# Patient Record
Sex: FEMALE | Race: WHITE | NOT HISPANIC OR LATINO | URBAN - METROPOLITAN AREA
[De-identification: names, ages, dates, MRNs, and addresses within clinical notes are randomized per-mention and may not be internally consistent; named-entity substitution may affect disease eponyms.]

---

## 2017-02-10 ENCOUNTER — HOSPITAL ENCOUNTER (OUTPATIENT)
Facility: CLINIC | Age: 47
Setting detail: OBSERVATION
Discharge: HOME | End: 2017-02-11
Attending: EMERGENCY MEDICINE | Admitting: OBSTETRICS & GYNECOLOGY
Payer: COMMERCIAL

## 2017-02-10 ENCOUNTER — APPOINTMENT (OUTPATIENT)
Dept: CT IMAGING | Facility: CLINIC | Age: 47
End: 2017-02-10
Attending: EMERGENCY MEDICINE
Payer: COMMERCIAL

## 2017-02-10 ENCOUNTER — APPOINTMENT (OUTPATIENT)
Dept: ULTRASOUND IMAGING | Facility: CLINIC | Age: 47
End: 2017-02-10
Attending: EMERGENCY MEDICINE
Payer: COMMERCIAL

## 2017-02-10 ENCOUNTER — TRANSFERRED RECORDS (OUTPATIENT)
Dept: HEALTH INFORMATION MANAGEMENT | Facility: CLINIC | Age: 47
End: 2017-02-10

## 2017-02-10 DIAGNOSIS — N83.201 CYST OF RIGHT OVARY: ICD-10-CM

## 2017-02-10 LAB
ALBUMIN UR-MCNC: 10 MG/DL
AMORPH CRY #/AREA URNS HPF: ABNORMAL /HPF
APPEARANCE UR: ABNORMAL
BACTERIA #/AREA URNS HPF: ABNORMAL /HPF
BILIRUB UR QL STRIP: NEGATIVE
COLOR UR AUTO: YELLOW
GLUCOSE UR STRIP-MCNC: NEGATIVE MG/DL
HGB UR QL STRIP: NEGATIVE
KETONES UR STRIP-MCNC: 5 MG/DL
LEUKOCYTE ESTERASE UR QL STRIP: NEGATIVE
MUCOUS THREADS #/AREA URNS LPF: PRESENT /LPF
NITRATE UR QL: NEGATIVE
PH UR STRIP: 8 PH (ref 5–7)
RBC #/AREA URNS AUTO: 2 /HPF (ref 0–2)
SP GR UR STRIP: 1.02 (ref 1–1.03)
SQUAMOUS #/AREA URNS AUTO: 2 /HPF (ref 0–1)
URN SPEC COLLECT METH UR: ABNORMAL
UROBILINOGEN UR STRIP-MCNC: NORMAL MG/DL (ref 0–2)
WBC #/AREA URNS AUTO: 2 /HPF (ref 0–2)

## 2017-02-10 PROCEDURE — 99285 EMERGENCY DEPT VISIT HI MDM: CPT | Mod: 25

## 2017-02-10 PROCEDURE — 25500064 ZZH RX 255 OP 636: Performed by: EMERGENCY MEDICINE

## 2017-02-10 PROCEDURE — 93976 VASCULAR STUDY: CPT | Mod: XS

## 2017-02-10 PROCEDURE — 74177 CT ABD & PELVIS W/CONTRAST: CPT

## 2017-02-10 PROCEDURE — 81001 URINALYSIS AUTO W/SCOPE: CPT | Performed by: EMERGENCY MEDICINE

## 2017-02-10 PROCEDURE — 96374 THER/PROPH/DIAG INJ IV PUSH: CPT

## 2017-02-10 PROCEDURE — 25000128 H RX IP 250 OP 636: Performed by: EMERGENCY MEDICINE

## 2017-02-10 RX ORDER — HYDROMORPHONE HYDROCHLORIDE 1 MG/ML
0.5 INJECTION, SOLUTION INTRAMUSCULAR; INTRAVENOUS; SUBCUTANEOUS
Status: COMPLETED | OUTPATIENT
Start: 2017-02-10 | End: 2017-02-11

## 2017-02-10 RX ORDER — BUPROPION HYDROCHLORIDE 150 MG/1
150 TABLET, EXTENDED RELEASE ORAL
COMMUNITY
Start: 2016-06-17 | End: 2017-02-11

## 2017-02-10 RX ORDER — IOPAMIDOL 755 MG/ML
500 INJECTION, SOLUTION INTRAVASCULAR ONCE
Status: COMPLETED | OUTPATIENT
Start: 2017-02-10 | End: 2017-02-10

## 2017-02-10 RX ADMIN — IOPAMIDOL 95 ML: 755 INJECTION, SOLUTION INTRAVENOUS at 23:13

## 2017-02-10 RX ADMIN — HYDROMORPHONE HYDROCHLORIDE 0.5 MG: 1 INJECTION, SOLUTION INTRAMUSCULAR; INTRAVENOUS; SUBCUTANEOUS at 22:38

## 2017-02-10 RX ADMIN — SODIUM CHLORIDE 65 ML: 9 INJECTION, SOLUTION INTRAVENOUS at 23:13

## 2017-02-10 ASSESSMENT — ENCOUNTER SYMPTOMS
COUGH: 0
FEVER: 0
ABDOMINAL PAIN: 1
NAUSEA: 1
VOMITING: 1
DIARRHEA: 0
RHINORRHEA: 0
CONSTIPATION: 0
SORE THROAT: 0

## 2017-02-10 NOTE — ED AVS SNAPSHOT
MRN:[id number]                      After Visit Summary   2/10/2017    Philomena Elizalde    MRN: [id number]           Thank you!     Thank you for choosing [facility] for your care. Our goal is always to provide you with excellent care. Hearing back from our patients is one way we can continue to improve our services. Please take a few minutes to complete the written survey that you may receive in the mail after you visit. If you would like to speak to someone directly about your visit please contact Patient Relations at [phone]. Thank you!          Patient Information     Date Of Birth          1970        About your hospital stay     You were admitted on:  February 11, 2017 You last received care in the:  [facility] Observation Department    You were discharged on:  February 11, 2017       Who to Call     For medical emergencies, please call 911.  For non-urgent questions about your medical care, please call your primary care provider or clinic, [phone]  For questions related to your surgery, please call your surgery clinic        Attending Provider     Provider    Oanh Carl MD Shibley, Kirk Anthony, MD       Primary Care Provider Office Phone # Fax #    Teodoro Mishra [phone]       [address] [address]        After Care Instructions     Discharge Instructions       Patient to arrange follow up appointment in 2  weeks                             Pending Results     Date and Time Order Name Status Description    2/11/2017 1053 Surgical pathology exam In process             Statement of Approval     Ordered          02/11/17 1108  I have reviewed and agree with all the recommendations and orders detailed in this document.   EFFECTIVE NOW     Approved and electronically signed by:  Yuko Mason MD             Admission Information        Provider Department Dept  "Phone    2/10/2017 Jhoan Rivero MD  Observation Dept [phone]      Your Vitals Were     Blood Pressure Pulse Temperature    121/65 mmHg 74 98.1  F (36.7  C) (Oral)    Respirations Height Weight    16 1.651 m (5' 5\") 87.816 kg (193 lb 9.6 oz)    BMI (Body Mass Index) Pulse Oximetry       32.22 kg/m2 95%       MyChart Information     [employer] lets you send messages to your doctor, view your test results, renew your prescriptions, schedule appointments and more. To sign up, go to [email or web address] . Click on \"Log in\" on the left side of the screen, which will take you to the Welcome page. Then click on \"Sign up Now\" on the right side of the page.     You will be asked to enter the access code listed below, as well as some personal information. Please follow the directions to create your username and password.     Your access code is: [email or web address]  Expires: 2017  1:15 AM     Your access code will  in 90 days. If you need help or a new code, please call your [place] clinic or [phone].        Care EveryWhere ID     This is your Care EveryWhere ID. This could be used by other organizations to access your [place] medical records  [id number]           Review of your medicines      START taking        Dose / Directions    oxyCODONE 5 MG IR tablet   Commonly known as:  ROXICODONE   Used for:  Cyst of right ovary        Dose:  5-10 mg   Take 1-2 tablets (5-10 mg) by mouth every 3 hours as needed for pain or other (Moderate to Severe)   Quantity:  10 tablet   Refills:  0         CONTINUE these medicines which have NOT CHANGED        Dose / Directions    Multi Vitamin Tabs        Dose:  1 tablet   Take 1 tablet by mouth daily   Refills:  0       priLOSEC 20 MG CR capsule   Generic drug:  omeprazole        Dose:  20 mg   Take 20 mg by mouth   Refills:  0            Where to get your medicines      Some of these will need a paper prescription and others can be bought over the counter. Ask " your nurse if you have questions.     Bring a paper prescription for each of these medications    - oxyCODONE 5 MG IR tablet             Protect others around you: Learn how to safely use, store and throw away your medicines at www.disposemymeds.org.             Medication List: This is a list of all your medications and when to take them. Check marks below indicate your daily home schedule. Keep this list as a reference.      Medications           Morning Afternoon Evening Bedtime As Needed    Multi Vitamin Tabs   Take 1 tablet by mouth daily                                oxyCODONE 5 MG IR tablet   Commonly known as:  ROXICODONE   Take 1-2 tablets (5-10 mg) by mouth every 3 hours as needed for pain or other (Moderate to Severe)                                priLOSEC 20 MG CR capsule   Take 20 mg by mouth   Generic drug:  omeprazole

## 2017-02-11 ENCOUNTER — ANESTHESIA (OUTPATIENT)
Dept: SURGERY | Facility: CLINIC | Age: 47
End: 2017-02-11
Payer: COMMERCIAL

## 2017-02-11 ENCOUNTER — ANESTHESIA EVENT (OUTPATIENT)
Dept: SURGERY | Facility: CLINIC | Age: 47
End: 2017-02-11
Payer: COMMERCIAL

## 2017-02-11 VITALS
HEIGHT: 65 IN | BODY MASS INDEX: 32.26 KG/M2 | TEMPERATURE: 98.1 F | SYSTOLIC BLOOD PRESSURE: 121 MMHG | WEIGHT: 193.6 LBS | RESPIRATION RATE: 16 BRPM | OXYGEN SATURATION: 95 % | HEART RATE: 74 BPM | DIASTOLIC BLOOD PRESSURE: 65 MMHG

## 2017-02-11 PROBLEM — R10.9 ABDOMINAL PAIN: Status: ACTIVE | Noted: 2017-02-11

## 2017-02-11 LAB
ABO + RH BLD: NORMAL
ABO + RH BLD: NORMAL
BASOPHILS # BLD AUTO: 0 10E9/L (ref 0–0.2)
BASOPHILS NFR BLD AUTO: 0.2 %
BLD GP AB SCN SERPL QL: NORMAL
BLOOD BANK CMNT PATIENT-IMP: NORMAL
DIFFERENTIAL METHOD BLD: ABNORMAL
EOSINOPHIL # BLD AUTO: 0.1 10E9/L (ref 0–0.7)
EOSINOPHIL NFR BLD AUTO: 0.5 %
ERYTHROCYTE [DISTWIDTH] IN BLOOD BY AUTOMATED COUNT: 14.4 % (ref 10–15)
HCG SERPL QL: NEGATIVE
HCT VFR BLD AUTO: 36.3 % (ref 35–47)
HGB BLD-MCNC: 11.7 G/DL (ref 11.7–15.7)
IMM GRANULOCYTES # BLD: 0.1 10E9/L (ref 0–0.4)
IMM GRANULOCYTES NFR BLD: 0.5 %
LYMPHOCYTES # BLD AUTO: 3.2 10E9/L (ref 0.8–5.3)
LYMPHOCYTES NFR BLD AUTO: 25.7 %
MCH RBC QN AUTO: 23.5 PG (ref 26.5–33)
MCHC RBC AUTO-ENTMCNC: 32.2 G/DL (ref 31.5–36.5)
MCV RBC AUTO: 73 FL (ref 78–100)
MONOCYTES # BLD AUTO: 0.7 10E9/L (ref 0–1.3)
MONOCYTES NFR BLD AUTO: 5.6 %
NEUTROPHILS # BLD AUTO: 8.4 10E9/L (ref 1.6–8.3)
NEUTROPHILS NFR BLD AUTO: 67.5 %
NRBC # BLD AUTO: 0 10*3/UL
NRBC BLD AUTO-RTO: 0 /100
PLATELET # BLD AUTO: 221 10E9/L (ref 150–450)
RBC # BLD AUTO: 4.97 10E12/L (ref 3.8–5.2)
SPECIMEN EXP DATE BLD: NORMAL
WBC # BLD AUTO: 12.4 10E9/L (ref 4–11)

## 2017-02-11 PROCEDURE — 25000125 ZZHC RX 250: Performed by: NURSE ANESTHETIST, CERTIFIED REGISTERED

## 2017-02-11 PROCEDURE — 25000128 H RX IP 250 OP 636: Performed by: OBSTETRICS & GYNECOLOGY

## 2017-02-11 PROCEDURE — 25000566 ZZH SEVOFLURANE, EA 15 MIN: Performed by: OBSTETRICS & GYNECOLOGY

## 2017-02-11 PROCEDURE — 84703 CHORIONIC GONADOTROPIN ASSAY: CPT | Performed by: OBSTETRICS & GYNECOLOGY

## 2017-02-11 PROCEDURE — 40000934 ZZH STATISTIC OUTPATIENT (NON-OBS) DAY

## 2017-02-11 PROCEDURE — 88305 TISSUE EXAM BY PATHOLOGIST: CPT | Mod: 26 | Performed by: OBSTETRICS & GYNECOLOGY

## 2017-02-11 PROCEDURE — 96361 HYDRATE IV INFUSION ADD-ON: CPT

## 2017-02-11 PROCEDURE — 25000132 ZZH RX MED GY IP 250 OP 250 PS 637: Performed by: OBSTETRICS & GYNECOLOGY

## 2017-02-11 PROCEDURE — 85025 COMPLETE CBC W/AUTO DIFF WBC: CPT | Performed by: OBSTETRICS & GYNECOLOGY

## 2017-02-11 PROCEDURE — 25000125 ZZHC RX 250: Performed by: ANESTHESIOLOGY

## 2017-02-11 PROCEDURE — 25000125 ZZHC RX 250: Performed by: OBSTETRICS & GYNECOLOGY

## 2017-02-11 PROCEDURE — 25000128 H RX IP 250 OP 636: Performed by: NURSE ANESTHETIST, CERTIFIED REGISTERED

## 2017-02-11 PROCEDURE — 37000009 ZZH ANESTHESIA TECHNICAL FEE, EACH ADDTL 15 MIN: Performed by: OBSTETRICS & GYNECOLOGY

## 2017-02-11 PROCEDURE — 40000935 ZZH STATISTIC OUTPATIENT (NON-OBS) EVE

## 2017-02-11 PROCEDURE — 86901 BLOOD TYPING SEROLOGIC RH(D): CPT | Performed by: OBSTETRICS & GYNECOLOGY

## 2017-02-11 PROCEDURE — 25800025 ZZH RX 258: Performed by: OBSTETRICS & GYNECOLOGY

## 2017-02-11 PROCEDURE — 25800025 ZZH RX 258: Performed by: ANESTHESIOLOGY

## 2017-02-11 PROCEDURE — 25000128 H RX IP 250 OP 636: Performed by: EMERGENCY MEDICINE

## 2017-02-11 PROCEDURE — 86850 RBC ANTIBODY SCREEN: CPT | Performed by: OBSTETRICS & GYNECOLOGY

## 2017-02-11 PROCEDURE — 27210794 ZZH OR GENERAL SUPPLY STERILE: Performed by: OBSTETRICS & GYNECOLOGY

## 2017-02-11 PROCEDURE — 40000306 ZZH STATISTIC PRE PROC ASSESS II: Performed by: OBSTETRICS & GYNECOLOGY

## 2017-02-11 PROCEDURE — 71000012 ZZH RECOVERY PHASE 1 LEVEL 1 FIRST HR: Performed by: OBSTETRICS & GYNECOLOGY

## 2017-02-11 PROCEDURE — 25800025 ZZH RX 258: Performed by: EMERGENCY MEDICINE

## 2017-02-11 PROCEDURE — 37000008 ZZH ANESTHESIA TECHNICAL FEE, 1ST 30 MIN: Performed by: OBSTETRICS & GYNECOLOGY

## 2017-02-11 PROCEDURE — 86900 BLOOD TYPING SEROLOGIC ABO: CPT | Performed by: OBSTETRICS & GYNECOLOGY

## 2017-02-11 PROCEDURE — 88305 TISSUE EXAM BY PATHOLOGIST: CPT | Performed by: OBSTETRICS & GYNECOLOGY

## 2017-02-11 PROCEDURE — 96376 TX/PRO/DX INJ SAME DRUG ADON: CPT

## 2017-02-11 PROCEDURE — G0378 HOSPITAL OBSERVATION PER HR: HCPCS

## 2017-02-11 PROCEDURE — 36000058 ZZH SURGERY LEVEL 3 EA 15 ADDTL MIN: Performed by: OBSTETRICS & GYNECOLOGY

## 2017-02-11 PROCEDURE — 36415 COLL VENOUS BLD VENIPUNCTURE: CPT | Performed by: OBSTETRICS & GYNECOLOGY

## 2017-02-11 PROCEDURE — 36000056 ZZH SURGERY LEVEL 3 1ST 30 MIN: Performed by: OBSTETRICS & GYNECOLOGY

## 2017-02-11 RX ORDER — LIDOCAINE 40 MG/G
CREAM TOPICAL
Status: DISCONTINUED | OUTPATIENT
Start: 2017-02-11 | End: 2017-02-11 | Stop reason: HOSPADM

## 2017-02-11 RX ORDER — CEFAZOLIN SODIUM 1 G/3ML
1 INJECTION, POWDER, FOR SOLUTION INTRAMUSCULAR; INTRAVENOUS SEE ADMIN INSTRUCTIONS
Status: DISCONTINUED | OUTPATIENT
Start: 2017-02-11 | End: 2017-02-11 | Stop reason: HOSPADM

## 2017-02-11 RX ORDER — GLYCOPYRROLATE 0.2 MG/ML
INJECTION, SOLUTION INTRAMUSCULAR; INTRAVENOUS PRN
Status: DISCONTINUED | OUTPATIENT
Start: 2017-02-11 | End: 2017-02-11

## 2017-02-11 RX ORDER — ONDANSETRON 2 MG/ML
4 INJECTION INTRAMUSCULAR; INTRAVENOUS EVERY 30 MIN PRN
Status: DISCONTINUED | OUTPATIENT
Start: 2017-02-11 | End: 2017-02-11 | Stop reason: HOSPADM

## 2017-02-11 RX ORDER — MEPERIDINE HYDROCHLORIDE 25 MG/ML
12.5 INJECTION INTRAMUSCULAR; INTRAVENOUS; SUBCUTANEOUS
Status: DISCONTINUED | OUTPATIENT
Start: 2017-02-11 | End: 2017-02-11 | Stop reason: HOSPADM

## 2017-02-11 RX ORDER — HYDROCODONE BITARTRATE AND ACETAMINOPHEN 5; 325 MG/1; MG/1
1-2 TABLET ORAL
Status: COMPLETED | OUTPATIENT
Start: 2017-02-11 | End: 2017-02-11

## 2017-02-11 RX ORDER — LABETALOL HYDROCHLORIDE 5 MG/ML
10 INJECTION, SOLUTION INTRAVENOUS
Status: DISCONTINUED | OUTPATIENT
Start: 2017-02-11 | End: 2017-02-11 | Stop reason: HOSPADM

## 2017-02-11 RX ORDER — ONDANSETRON 4 MG/1
4 TABLET, ORALLY DISINTEGRATING ORAL EVERY 30 MIN PRN
Status: DISCONTINUED | OUTPATIENT
Start: 2017-02-11 | End: 2017-02-11 | Stop reason: HOSPADM

## 2017-02-11 RX ORDER — IBUPROFEN 600 MG/1
600 TABLET, FILM COATED ORAL
Status: DISCONTINUED | OUTPATIENT
Start: 2017-02-11 | End: 2017-02-11 | Stop reason: HOSPADM

## 2017-02-11 RX ORDER — ONDANSETRON 2 MG/ML
4 INJECTION INTRAMUSCULAR; INTRAVENOUS EVERY 8 HOURS PRN
Status: DISCONTINUED | OUTPATIENT
Start: 2017-02-11 | End: 2017-02-11 | Stop reason: HOSPADM

## 2017-02-11 RX ORDER — DEXAMETHASONE SODIUM PHOSPHATE 4 MG/ML
INJECTION, SOLUTION INTRA-ARTICULAR; INTRALESIONAL; INTRAMUSCULAR; INTRAVENOUS; SOFT TISSUE PRN
Status: DISCONTINUED | OUTPATIENT
Start: 2017-02-11 | End: 2017-02-11

## 2017-02-11 RX ORDER — NALOXONE HYDROCHLORIDE 0.4 MG/ML
.1-.4 INJECTION, SOLUTION INTRAMUSCULAR; INTRAVENOUS; SUBCUTANEOUS
Status: DISCONTINUED | OUTPATIENT
Start: 2017-02-11 | End: 2017-02-11 | Stop reason: HOSPADM

## 2017-02-11 RX ORDER — SODIUM CHLORIDE, SODIUM LACTATE, POTASSIUM CHLORIDE, CALCIUM CHLORIDE 600; 310; 30; 20 MG/100ML; MG/100ML; MG/100ML; MG/100ML
INJECTION, SOLUTION INTRAVENOUS CONTINUOUS
Status: DISCONTINUED | OUTPATIENT
Start: 2017-02-11 | End: 2017-02-11 | Stop reason: HOSPADM

## 2017-02-11 RX ORDER — FENTANYL CITRATE 50 UG/ML
INJECTION, SOLUTION INTRAMUSCULAR; INTRAVENOUS PRN
Status: DISCONTINUED | OUTPATIENT
Start: 2017-02-11 | End: 2017-02-11

## 2017-02-11 RX ORDER — HYDROMORPHONE HYDROCHLORIDE 1 MG/ML
.3-.5 INJECTION, SOLUTION INTRAMUSCULAR; INTRAVENOUS; SUBCUTANEOUS EVERY 10 MIN PRN
Status: DISCONTINUED | OUTPATIENT
Start: 2017-02-11 | End: 2017-02-11 | Stop reason: HOSPADM

## 2017-02-11 RX ORDER — NEOSTIGMINE METHYLSULFATE 1 MG/ML
VIAL (ML) INJECTION PRN
Status: DISCONTINUED | OUTPATIENT
Start: 2017-02-11 | End: 2017-02-11

## 2017-02-11 RX ORDER — PROPOFOL 10 MG/ML
INJECTION, EMULSION INTRAVENOUS PRN
Status: DISCONTINUED | OUTPATIENT
Start: 2017-02-11 | End: 2017-02-11

## 2017-02-11 RX ORDER — HYDROMORPHONE HYDROCHLORIDE 1 MG/ML
.3-.5 INJECTION, SOLUTION INTRAMUSCULAR; INTRAVENOUS; SUBCUTANEOUS
Status: DISCONTINUED | OUTPATIENT
Start: 2017-02-11 | End: 2017-02-11 | Stop reason: HOSPADM

## 2017-02-11 RX ORDER — OXYCODONE HYDROCHLORIDE 5 MG/1
5-10 TABLET ORAL
Qty: 10 TABLET | Refills: 0 | Status: SHIPPED | OUTPATIENT
Start: 2017-02-11

## 2017-02-11 RX ORDER — KETOROLAC TROMETHAMINE 30 MG/ML
30 INJECTION, SOLUTION INTRAMUSCULAR; INTRAVENOUS ONCE
Status: COMPLETED | OUTPATIENT
Start: 2017-02-11 | End: 2017-02-11

## 2017-02-11 RX ORDER — FENTANYL CITRATE 50 UG/ML
25-50 INJECTION, SOLUTION INTRAMUSCULAR; INTRAVENOUS
Status: DISCONTINUED | OUTPATIENT
Start: 2017-02-11 | End: 2017-02-11 | Stop reason: HOSPADM

## 2017-02-11 RX ORDER — CEFAZOLIN SODIUM 2 G/100ML
2 INJECTION, SOLUTION INTRAVENOUS
Status: COMPLETED | OUTPATIENT
Start: 2017-02-11 | End: 2017-02-11

## 2017-02-11 RX ORDER — PHENAZOPYRIDINE HYDROCHLORIDE 200 MG/1
200 TABLET, FILM COATED ORAL ONCE
Status: DISCONTINUED | OUTPATIENT
Start: 2017-02-11 | End: 2017-02-11 | Stop reason: HOSPADM

## 2017-02-11 RX ORDER — ACETAMINOPHEN 325 MG/1
975 TABLET ORAL ONCE
Status: COMPLETED | OUTPATIENT
Start: 2017-02-11 | End: 2017-02-11

## 2017-02-11 RX ORDER — BUPIVACAINE HYDROCHLORIDE AND EPINEPHRINE 2.5; 5 MG/ML; UG/ML
INJECTION, SOLUTION INFILTRATION; PERINEURAL PRN
Status: DISCONTINUED | OUTPATIENT
Start: 2017-02-11 | End: 2017-02-11 | Stop reason: HOSPADM

## 2017-02-11 RX ADMIN — HYDROCODONE BITARTRATE AND ACETAMINOPHEN 2 TABLET: 5; 325 TABLET ORAL at 15:46

## 2017-02-11 RX ADMIN — FENTANYL CITRATE 100 MCG: 50 INJECTION, SOLUTION INTRAMUSCULAR; INTRAVENOUS at 10:13

## 2017-02-11 RX ADMIN — HYDROMORPHONE HYDROCHLORIDE 0.5 MG: 1 INJECTION, SOLUTION INTRAMUSCULAR; INTRAVENOUS; SUBCUTANEOUS at 00:48

## 2017-02-11 RX ADMIN — Medication 3 MG: at 11:00

## 2017-02-11 RX ADMIN — SODIUM CHLORIDE, POTASSIUM CHLORIDE, SODIUM LACTATE AND CALCIUM CHLORIDE: 600; 310; 30; 20 INJECTION, SOLUTION INTRAVENOUS at 05:36

## 2017-02-11 RX ADMIN — HYDROMORPHONE HYDROCHLORIDE 0.5 MG: 1 INJECTION, SOLUTION INTRAMUSCULAR; INTRAVENOUS; SUBCUTANEOUS at 02:59

## 2017-02-11 RX ADMIN — GLYCOPYRROLATE 0.4 MG: 0.2 INJECTION, SOLUTION INTRAMUSCULAR; INTRAVENOUS at 11:00

## 2017-02-11 RX ADMIN — SODIUM CHLORIDE, POTASSIUM CHLORIDE, SODIUM LACTATE AND CALCIUM CHLORIDE: 600; 310; 30; 20 INJECTION, SOLUTION INTRAVENOUS at 10:05

## 2017-02-11 RX ADMIN — PROPOFOL 200 MG: 10 INJECTION, EMULSION INTRAVENOUS at 10:13

## 2017-02-11 RX ADMIN — KETOROLAC TROMETHAMINE 30 MG: 30 INJECTION, SOLUTION INTRAMUSCULAR at 10:05

## 2017-02-11 RX ADMIN — FENTANYL CITRATE 50 MCG: 50 INJECTION, SOLUTION INTRAMUSCULAR; INTRAVENOUS at 10:42

## 2017-02-11 RX ADMIN — FENTANYL CITRATE 100 MCG: 50 INJECTION, SOLUTION INTRAMUSCULAR; INTRAVENOUS at 10:25

## 2017-02-11 RX ADMIN — DEXAMETHASONE SODIUM PHOSPHATE 4 MG: 4 INJECTION, SOLUTION INTRAMUSCULAR; INTRAVENOUS at 10:13

## 2017-02-11 RX ADMIN — ACETAMINOPHEN 975 MG: 325 TABLET, FILM COATED ORAL at 10:03

## 2017-02-11 RX ADMIN — ROCURONIUM BROMIDE 40 MG: 10 INJECTION INTRAVENOUS at 10:13

## 2017-02-11 RX ADMIN — CEFAZOLIN SODIUM 2 G: 2 INJECTION, SOLUTION INTRAVENOUS at 10:05

## 2017-02-11 RX ADMIN — HYDROMORPHONE HYDROCHLORIDE 0.5 MG: 1 INJECTION, SOLUTION INTRAMUSCULAR; INTRAVENOUS; SUBCUTANEOUS at 05:38

## 2017-02-11 RX ADMIN — ONDANSETRON 4 MG: 2 INJECTION INTRAMUSCULAR; INTRAVENOUS at 10:13

## 2017-02-11 RX ADMIN — Medication 30 MG: at 10:13

## 2017-02-11 RX ADMIN — MIDAZOLAM HYDROCHLORIDE 2 MG: 1 INJECTION, SOLUTION INTRAMUSCULAR; INTRAVENOUS at 10:05

## 2017-02-11 RX ADMIN — DEXTROSE AND SODIUM CHLORIDE: 5; 450 INJECTION, SOLUTION INTRAVENOUS at 01:36

## 2017-02-11 RX ADMIN — HYDROMORPHONE HYDROCHLORIDE 0.5 MG: 1 INJECTION, SOLUTION INTRAMUSCULAR; INTRAVENOUS; SUBCUTANEOUS at 08:40

## 2017-02-11 RX ADMIN — SODIUM CHLORIDE, POTASSIUM CHLORIDE, SODIUM LACTATE AND CALCIUM CHLORIDE: 600; 310; 30; 20 INJECTION, SOLUTION INTRAVENOUS at 11:44

## 2017-02-11 ASSESSMENT — PAIN DESCRIPTION - DESCRIPTORS: DESCRIPTORS: HEADACHE;THROBBING;SHARP

## 2017-02-11 ASSESSMENT — LIFESTYLE VARIABLES: TOBACCO_USE: 1

## 2017-02-11 NOTE — ANESTHESIA PREPROCEDURE EVALUATION
Anesthesia Evaluation     . Pt has had prior anesthetic. Type: General    No history of anesthetic complications     ROS/MED HX    ENT/Pulmonary:     (+)tobacco use, Current use , . .    Neurologic:  - neg neurologic ROS     Cardiovascular:         METS/Exercise Tolerance:     Hematologic: Comments: Lab Test        02/11/17                       0505          WBC          12.4*         HGB          11.7          MCV          73*           PLT          221            No lab results found.            Musculoskeletal:  - neg musculoskeletal ROS       GI/Hepatic:  - neg GI/hepatic ROS       Renal/Genitourinary:  - ROS Renal section negative       Endo:  - neg endo ROS       Psychiatric:  - neg psychiatric ROS       Infectious Disease:  - neg infectious disease ROS       Malignancy:         Other:    - neg other ROS           Physical Exam  Normal systems: cardiovascular, pulmonary and dental    Airway   Mallampati: II  TM distance: >3 FB  Neck ROM: full    Dental     Cardiovascular       Pulmonary                     Anesthesia Plan      History & Physical Review  History and physical reviewed and following examination; no interval change.    ASA Status:  2 emergent.    NPO Status:  > 8 hours    Plan for General with Intravenous induction. Maintenance will be Balanced.    PONV prophylaxis:  Ondansetron (or other 5HT-3) and Dexamethasone or Solumedrol       Postoperative Care  Postoperative pain management:  IV analgesics and Oral pain medications.      Consents  Anesthetic plan, risks, benefits and alternatives discussed with:  Patient..                          .

## 2017-02-11 NOTE — ED NOTES
Bed: ED28  Expected date: 2/10/17  Expected time: 9:02 PM  Means of arrival: Ambulance  Comments:  [facility] [place]

## 2017-02-11 NOTE — ED NOTES
PRIMARY DIAGNOSIS: Ovarian cyst, abd pain.   Primary Symptoms: ABD pain, nausea.   OUTPATIENT/OBSERVATION GOALS TO BE MET BEFORE DISCHARGE:   1. Orthostatic symptoms (BP decrease or HR increase with patient upright)? N/A   2. Vital Signs stable? Yes   3. Documented urine output: Yes   4. Tolerating PO fluid: No - NPO for OB   5. Symptoms improved: No - RLQ  6. Labs WNL? Yes  7. ADLs back to baseline? Yes   8. Activity and level of assistance: Ambulating independently.   9. Pain status: Improved but still requiring IV narcotics. Improved after IV dilaudid  10. Barriers to discharge noted Yes, Pain control, GYN consult.   Is patient a falls risk? No   Falls armband on? Not applicable   Within Arm's Reach? No.Reason not within arm's reach is: A&O x 4, steady gait.   Bed alarm turned on? Not applicable   Personal alarm in place and turned on? Not applicable   CT scan shows ovarian cyst, possible ovarian torsion. GYN admitting and has yet to see pt. This AM. Pt. Alert, oriented, reporting 5/10 pain in RLQ with pain goal of 2-3/10, 0.5 dilaudid. Pt states medication reduces pain but doesn't completely relieve. Heating pad offered.

## 2017-02-11 NOTE — ED NOTES
OBSERVATION patient IN TIME: 0203  ROOM # 203    Living Situation (if not independent, order SW consult): Independent  Facility name: N/A    Activity level at baseline: Independent  Activity level on admit: Independent.     Is patient a falls risk? No  Falls armband on? Not applicable  Within Arm's Reach? No.Reason not within arm's reach is: Pt A & O, steady gait, Calls appropriately.   Bed alarm turned on?   Not applicable  Personal alarm in place and turned on?   Not applicable    Patient registered to observation; given Patient Bill of Rights; given the opportunity to ask questions about observation status and their plan of care.  Patient has been oriented to the observation room, bathroom, and call light is in place.

## 2017-02-11 NOTE — ED NOTES
PRIMARY DIAGNOSIS: Ovarian cyst, abd pain.   Primary Symptoms: ABD pain, nausea.     OUTPATIENT/OBSERVATION GOALS TO BE MET BEFORE DISCHARGE:    1. Orthostatic symptoms (BP decrease or HR increase with patient upright)?  N/A  2. Vital Signs stable? Yes  3. Documented urine output: Yes  4. Tolerating PO fluid: No  5. Symptoms improved: No  6. Labs WNL? No  7. ADLs back to baseline?  Yes  8. Activity and level of assistance: Ambulating independently.  9. Pain status: Improved but still requiring IV narcotics.  10. Barriers to discharge noted Yes, Pain control, GYN consult.     Is patient a falls risk? No  Falls armband on?  Not applicable  Within Arm's Reach? No.Reason not within arm's reach is: A&O x 4, steady gait.   Bed alarm turned on?   Not applicable  Personal alarm in place and turned on?   Not applicable    Pt admitted to observation unit following sudden onset abd pain to right lower quadrant.  CT scan shows ovarian cyst, possible ovarian torsion. GYN consulted in ED.  No surgical intervention at this time.  GYN to follow pt.  VSS, pt A&O x 4.  IV dilaudid given x 1 for pain control.  Pt states medication reduces pain but doesn't completely relieve. Denies need for further pain interventions at this time.

## 2017-02-11 NOTE — ANESTHESIA CARE TRANSFER NOTE
Patient: Philomena Elizalde    Procedure(s):  LAPAROSCOPIC BILATERAL SALPINGO-OOPHORECTOMY TORSION RIGHT OVARY - Wound Class: II-Clean Contaminated    Diagnosis: ovarian torsion  Diagnosis Additional Information: No value filed.    Anesthesia Type:   General     Note:  Airway :Face Mask  Patient transferred to:PACU        Vitals: (Last set prior to Anesthesia Care Transfer)    CRNA VITALS  2/11/2017 1042 - 2/11/2017 1117      2/11/2017             Pulse: 99    SpO2: 96 %    Resp Rate (observed): 13                Electronically Signed By: JUWAN Duncan CRNA  February 11, 2017  11:17 AM

## 2017-02-11 NOTE — OR NURSING
Dr. Yuko Mason unable to reach  by phone as he is traveling.  Requested floor RN to call her when  arrives on unit.  Report given to floor RN Felipe.

## 2017-02-11 NOTE — ED NOTES
Pt presents via EMS for evaluation of sudden onset RLQ abdominal pain, that was 10/10 and started around 1530. Pt had one bout of emesis. Pt' symptoms were constant, so she went to [facility]. Pt had blood work, which showed slightly elevated WBC. Pt was given 1L of IV fluids and Toradol. Pt was sent to ED to r/o appendicitis.

## 2017-02-11 NOTE — PHARMACY-ADMISSION MEDICATION HISTORY
Admission medication history interview status for this patient is complete. See [facility] admission navigator for allergy information, prior to admission medications and immunization status.     Medication history interview source(s):Patient  Medication history resources (including written lists, pill bottles, clinic record):[facility]  Primary pharmacy:[facility]    Changes made to [facility] medication list:  Added: multivitamin  Deleted: wellbutrin  Changed: none    Actions taken by pharmacist (provider contacted, etc):None     Additional medication history information:None    Medication reconciliation/reorder completed by provider prior to medication history? No    For patients on insulin therapy: no (Yes/No)  Lantus/levemir/NPH/Mix 70/30 dose:  _____   in AM/PM  or twice daily   Sliding scale Novolog Y/N  If Yes, do you have a baseline novolog pre-meal dose:  ______units with meals   Patients eat three meals a day:   Y/N     Any Barriers to therapy:  cost of medications/comfortable with giving injections (if applicable)/ comfortable and confident with current diabetes regimen       Prior to Admission medications    Medication Sig Last Dose Taking? Auth Provider   Multiple Vitamin (MULTI VITAMIN) TABS Take 1 tablet by mouth daily 2/10/2017 at Unknown time Yes Unknown, Entered By History   omeprazole (PRILOSEC) 20 MG CR capsule Take 20 mg by mouth 2/10/2017 at AM Yes Reported, Patient

## 2017-02-11 NOTE — ED NOTES
Assisted with patient triage (ambulance). Applied monitoring equipment onto Patient (Pulse ox and BP).

## 2017-02-11 NOTE — H&P
HISTORY OF PRESENT ILLNESS:  On 02/10/2017 Philomena Elizalde was in counseling session and had to stand up to urinate.  She had sudden onset of right lower quadrant pain at that point, which was approximately 3:30 in the afternoon.  She notes she then went out to her car and became nauseated and had emesis x1.  She was at home and had another rapid onset of pain and nausea and reported to the [facility].  When she was at the urgent care clinic, there was a slight concern for an appendicitis.  The patient does have a history of bilateral inguinal hernias, which she has reduced in the past and she notes this feels different than no symptoms.  When she reported she notes that the pain was 8-9/10 and she has had minimal resolution of this unless she uses IV pain medication.  She does note that the nausea has resolved.      She denies any fevers, chills, persistent nausea, pain or changes in her bowel movements.  She has no new sexual partners.  She is currently in recovery of 1 year for alcoholism.      ALLERGIES:  The patient has no known drug allergies.      CURRENT MEDICATIONS:  Include Zyban, omeprazole, Wellbutrin.      PAST MEDICAL HISTORY:   1.  Prior recovering alcoholic.   2.  Bilateral inguinal hernias.   3.  Depression.   4.  Prior history of menorrhagia.      PAST SURGICAL HISTORY:    1.   section x2.   2.  Supracervical hysterectomy.      FAMILY HISTORY:  Noncontributory for any blood dyscrasias or complications with anesthesia.      SOCIAL HISTORY:  She is .  She is currently an LPN student.  She is positive for tobacco use currently.  She is a recovering alcoholic and no history of drug use.      REVIEW OF SYSTEMS:  Notable for persistent right lower quadrant pain with resolution of her nausea and vomiting.  They are otherwise negative.      PHYSICAL EXAMINATION:   VITAL SIGNS:  Blood pressure of 132/70, temperature of 98.6, heart rate of 89, pain of 5/10 with  Dilaudid and 8/10 with no Dilaudid.   CARDIOVASCULAR:  Regular rate and rhythm.   CHEST:  Clear to auscultation bilaterally.   ABDOMEN:  Soft with mild tenderness in the upper quadrant.  She has significant tenderness in the right lower quadrant that radiates to the left lower quadrant.  When we press on the left side, she has mild referred pain to the right.   EXTREMITIES:  Soft, nontender, no cords, erythema or edema.      LABORATORY:  Labs last night showed slightly elevated white count of 12.4.  Urinalysis was essentially negative for any infection.  CT showed a possible complex cyst in the right adnexa and ultrasound confirmed a right ovary of 5.4 in largest diameter that was enlarged with no blood flow, and it also had a serpiginous-appearing structure next to it that was very cystic; this is unknown if this is the tube, it may be a torsed tube or part of a torsed ovary or complex cyst.  With the lack of blood flow, there is suspicion for torsion.      ASSESSMENT AND PLAN:  A 46-year-old post-hysterectomy female with sudden onset of right lower quadrant pain with imaging that is highly suspicious for right ovarian torsion.  We discussed with her that this may be the cause of her pain.  There is no current findings for appendicitis.  White count is mildly elevated; however nausea has resolved and McBurney's point is negative on exam.  We did discuss with her the risks, benefits and alternatives to laparoscopy.  We also discussed with her the risks of removal of the right tube and ovary.  She did ask several questions and we will go ahead and proceed to the operating room.         JOSE J WING MD             D: 2017 09:15   T: 2017 11:31   MT: [id number]      Name:     KANE YATES   MRN:      [id number]        Account:      [id number]   :      1970           Admitted:     [id number]      Document: [id number]

## 2017-02-11 NOTE — ED NOTES
PRIMARY DIAGNOSIS/PROCEDURE: Single site bilateral salpingooophorectomy and ADRIANNA  OUTPATIENT/OBSERVATION GOALS TO BE MET BEFORE DISCHARGE:    1. Stable vital signs Yes  2. Tolerating diet:No - will try clears  3. Pain controlled with oral pain medications:  Yes - denies pain at this time  4. Positive bowel sounds:  Yes   5. Voiding without difficulty:  No - pt.'s baugh catheter removed in PACU  6. Able to ambulate:  No - pt. Stood up and moved from bed to bed  7. Provider specific discharge goals met:  No    Is patient a falls risk? Yes  Reason for falls risk: High Risk Medications  Falls armband on?  YES  Within Arm's Reach? Yes   Bed alarm turned on?   Not applicable - pt. Alert, oriented and willing to call  Personal alarm in place and turned on?   Not applicable

## 2017-02-11 NOTE — ED PROVIDER NOTES
History     Chief Complaint:  Abdominal Pain     HPI   Philomena Elizalde is a 46 year old female who presents via EMS with constant, non-radiating RLQ abdominal pain since  today. The patient reports that she was feeling really well this morning and ate breakfast and went to work like normal. At 1530 today she had to urinate so she went to the bathroom, and as soon as she started to urinate she had abrupt onset of severe, sharp RLQ abdominal pain. She initially had nausea with vomiting but that has since resolved. The patient first went to [facility] urgent care where she underwent labs, results below, and was sent here with concern for appendicitis. En route, the patient received IV Toradol and 1 liter of IV fluids which improved her pain from severe to 1/10. Her pain was as high as 8.5/10 at home. The patient notes that she has a self-diagnosed history of bilateral inguinal hernias which she has reduced herself three times; her pain today feels different. The patient denies recent cough, cold symptoms, sore throat, diarrhea, or constipation.     Labs from [facility] today:  CBC: WBC 12.5 (H), HGB 12.8,   CMP: Glucose 118 (H), o/w WNL (Creatinine 0.8)      Allergies:  The patient has no known drug allergies.     Medications:    Zyban   Omeprazole  Wellbutrin    Past Medical History:    Self-diagnosed bilateral inguinal hernias  Depression    Past Surgical History:     x2  Partial hysterectomy    Family History:    History reviewed.  No significant family history.      Social History:  Relationship status:   Tobacco use: Positive  The patient presents via EMS alone.     Review of Systems   Constitutional: Negative for fever.   HENT: Negative for congestion, rhinorrhea and sore throat.    Respiratory: Negative for cough.    Gastrointestinal: Positive for nausea, vomiting and abdominal pain. Negative for diarrhea and constipation.   All other systems reviewed and are  "negative.    Physical Exam     Physical Exam  VITAL SIGNS: /83 mmHg  Pulse 85  Temp(Src) 98.4  F (36.9  C) (Oral)  Resp 16  Ht 1.651 m (5' 5\")  Wt 86.183 kg (190 lb)  BMI 31.62 kg/m2  SpO2 98%  Breastfeeding? No   Constitutional:  Well developed, Well nourished, Comfortable appearing   HENT:  Bilateral external ears normal, Mucous membranes moist, Nose normal. Neck- Normal range of motion, Supple  Respiratory:  Normal breath sounds, No respiratory distress, No wheezing,  Cardiovascular:  Normal heart rate, Normal rhythm, No murmurs,    GI:  Bowel sounds normal, Soft, Nondistended, Mild RLQ tenderness, Mild right referred pain with palpation of the LLQ, no rebound or guarding, no CVA tenderness   Musculoskeletal:  Intact distal pulses, No edema, grossly unremarkable range of motion   Integument:  Warm, Dry   Neurologic:  Alert, attentive and appropriately oriented  Psychiatric:  Mood and affect normal.      Emergency Department Course   Imaging:  Radiographic findings were communicated with the patient who voiced understanding of the findings.    CT Abdomen and Pelvis, with contrast, per radiology:   1. Large multicystic right ovary. Small amount of free pelvic fluid.  2. No appendicitis or other bowel inflammation or obstruction.  3. Sigmoid diverticula without acute diverticulitis.    Pelvis US, with transvaginal & Abd/Pel Duplex Limited, per radiology:  1. Serpiginous cystic area medial to the right ovary may be a hydrosalpinx.  2. The right ovary is enlarged. Arterial blood flow could not be documented within the right ovary and ovarian torsion is a possibility.    Laboratory:  UA: Slightly cloudy, yellow urine: 5 Ketones (A), pH 8.0 (H), 10 Albumin (A), Few Bacteria (A), 2 Squamous Epithelial ((H), Mucous Present (A), Moderate Amorphous Crystals (A), o/w WNL  Urine Culture: Pending     Interventions:  2238: Dilaudid, 0.5 mg, IV injection  0048: Dilaudid, 0.5 mg, IV injection      The patient's " symptoms were partially improved with parenteral narcotics.     Emergency Department Course:  Nursing notes and vitals reviewed.  I performed an exam of the patient as documented above.  The above workup was undertaken.  2334: I rechecked the patient and discussed CT results.  0022: Discussed the patient with Dr. Eppreson of [facility].   0040: I rechecked the patient.   0058: I rechecked the patient and dicussed US results. She has remained with improved pain here. Minimal-mild RLQ ttp, no rebound or guarding.  1:19 AM: Discussed the patient with Dr. Hogan of OBGY who accepts care of the patient. Discussed US findings and exam. As per d/w him will admit for serial exams and for them to see, possible torsion.  Findings and plan explained to the patient who consents to observation. Discussed the patient with Dr. English, who will place the patient in observation in the observation area.    Impression & Plan    Medical Decision Making:  Philomena Elizalde is a 46 year old female who presents with pelvic pain.  A broad differential diagnosis was considered including colitis, appendicitis, intestinal cramping, pyelonephritis, UTI, kidney stone, constipation, diverticulitis, volvulus, ileus, obstruction, ovarian cyst (enlarged or ruptured), ovarian torsion, etc as possibilities.    The workup in the ED shows an enlarged multicystic right ovary with arterial blood flow not seen on US.  Doubt PID or TOA given clear findings of cyst without signs of abscess.  No other etiology for the patients pain is found at this point. Due to the patient's level of pain and the possibility of torsion on ultrasound, I will admit for serial exams and further workup.  Patient remained hemodynamically stable in ED and pain well controlled. Discussed the patient with the OBGYN service who accepted patient care. Questions were answered prior to admission.       Diagnosis:    ICD-10-CM    1. Cyst of right ovary N83.201         Disposition:  Admit to a  bed under the care of Dr. Hogan.       I, Anni Mireles, am serving as a scribe on 2/10/2017 at 9:13 PM to personally document services performed by Oanh Carl MD, based on my observations and the provider's statements to me.    [facility] EMERGENCY DEPARTMENT    Oanh Carl MD  02/11/17 0121

## 2017-02-11 NOTE — ED NOTES
Observation Brochure and Video   Patient informed of observation status based on provider's order. Observation Brochure was given and video watched.  Malena Casillas RN

## 2017-02-11 NOTE — BRIEF OP NOTE
[facility]  Gynecology Brief Operative Note    Pre-operative diagnosis: ovarian torsion   Post-operative diagnosis Same  2. Abnormal appearance of left ovary  3. Adhesions of left colon   Procedure: Procedure(s):  Single site bilateral salpingooophorectomy and ADRIANNA   Surgeon: Yuko Mason MD   Assistants(s): Sri Rose MD   Anesthesia: General    Estimated blood loss: 10 cc    Specimens: Bilateral tubes and ovaries   Findings: Torsed and necrotic right tube and ovary, abnormal cystic appearance of the left ovary and adhesions of the left colon to left pelvic side wall.    Complications: Abnormal left ovary and adhesions of colon to left pelvic side wall.    Comments: See dictated operative report for full details       Yuko Mason

## 2017-02-12 NOTE — OP NOTE
DATE OF PROCEDURE:  02/11/2017      PREOPERATIVE DIAGNOSIS:     1.  Suspected right ovarian torsion.      POSTOPERATIVE DIAGNOSES:     1.  Suspected right ovarian torsion.   2.  Abnormal appearance of the left ovary.   3.  Adhesions of the left colon.      PROCEDURE:  Single site bilateral salpingo-oophorectomy and lysis of adhesions.      SURGEON:  Yuko Mason MD      ASSISTANT:  Sri Rose MD      ANESTHESIA:  General endotracheal anesthesia.      ESTIMATED BLOOD LOSS:  10 cc.      SPECIMENS:  Bilateral tubes and ovaries.      FINDINGS:  Torsed and necrotic right tube and ovary as well as abnormal cystic appearance to the left ovary and adhesions of the left colon to the pelvic sidewall.      COMPLICATIONS:  Abnormal left ovary appearance and adhesions of the colon to the left pelvic sidewall.      INDICATIONS:  Philomena Elizalde is a 46-year-old status post supracervical hysterectomy female with sudden onset of right lower quadrant pain yesterday afternoon.  This had no resolution when she was seen in urgent care and due to suspicion for appendicitis she was sent to the Emergency Department.  Imaging studies did show a suspicion for torsion.  She was evaluated overnight and continued to have significant pain in the morning.  We discussed options for management.  We gave risks, benefits and alternatives to a single site right salpingo-oophorectomy for possible torsion and she did sign an informed consent.      DESCRIPTION OF PROCEDURE:  The patient is taken to the operating room where general endotracheal anesthesia was placed.  She was prepped and draped in the normal sterile fashion.  A pause for the cause was performed and patient and procedure were correctly identified.  At that point, the Mao catheter had been placed and a through and through umbilical incision was made, it was carried down to the underlying fascial layer, which was grasped with 2-0 Vicryl sutures and brought up to the midline.  It was  then entered sharply with the Watson scissors.  The TriPort was then deployed into the abdomen and insufflated to a maximum pressure of 15 mmHg.  She was placed in Trendelenburg presentation.  At this point, inspection of the right tube and ovary showed a completely black necrotic right tube and ovary that was enlarged in size.  It was twisted once on the pedicle.  This was untwisted and there was no blanching of the tube and ovary.  It was quite necrotic and edematous.  Visualization of the left tube and ovary showed a left ovary with very little normal-appearing ovarian tissue and did show multiple cystic areas.  For that reason, decision was made to proceed with a BSO.  Using the Thunderbeat electrocautery, the IP connection on the right side was disconnected and this ovary and tube were placed in the posterior cul-de-sac.  At that point, the left tube and ovary were again reinspected.  Adhesions of the colon to the left pelvic sidewall.  These were taken down from filmy adhesions with the Thunderbeat cautery and dissected away from the left pelvic sidewall.  At that point the left tube and ovary were better exposed.  They were placed on traction and again the IP ligament was cauterized and cut and this ovary and tube was again put into the prior posterior cul-de-sac.  At that point a 10 mm bag was deployed into the abdomen and both ovaries and tubes were placed into this bag.  The bag was then brought up to the single site incision and this was taken out in pieces using a ring forceps.  The left tube and ovary were able to maintain in their normal state.  These were sent to pathology in separate specimens.  The bag was removed and the abdomen was reinspected.  It was irrigated and washed of all prior bloody exudates.  All surgical sites were found to be hemostatic and instrumentation was removed.  The fascial layer was closed with a running suture of 0 Vicryl found to be without palpable defect and the skin was  closed with 4-0 Monocryl and infiltrated with 0.5% Marcaine.  This was then dressed with Steri-Strips.  The patient tolerated the procedure well.  A debrief was performed and she will be transferred to PACU in stable condition.         JOSE J WING MD             D: 2017 11:32   T: 2017 19:46   MT: #[id number]      Name:     KANE YATES   MRN:      [id number]        Account:        [id number]   :      1970           Procedure Date: 2017      Document: [id number]

## 2017-02-14 LAB — COPATH REPORT: NORMAL

## 2017-04-06 NOTE — DISCHARGE SUMMARY
DATE OF ADMISSION:  02/10/2017.       DATE OF DISCHARGE:  2017.       HOSPITAL COURSE:  Philomena Elizalde was brought into the hospital for abdominal pain.  She was found to have suspicion of a left ovarian torsion.  She was admitted and monitored for her pain overnight.  By the morning, she continued to have significant pain and discussion of management with had.  She did opt for definitive management with surgical oophorectomy.  She underwent a single site bilateral salpingo-oophorectomy with lysis of adhesions and that afternoon was doing quite well, she was discharged home and given routine discharge teaching.         JOSE J WING MD             D: 2017 09:02   T: 2017 10:15   MT: [id number]      Name:     PHILOMENA ELIZALDE   MRN:      [id number]        Account:        [id number]   :      1970           Admit Date:     [id number]                                  Discharge Date: 2017      Document: [id number]

## 2019-04-06 NOTE — ED AVS SNAPSHOT
[facility] Observation Department    [address]    [facility] [address]    Phone:  [phone]                                       Philomena Elizalde   MRN: [id number]    Department:  [facility] Observation Department   Date of Visit:  2/10/2017           After Visit Summary Signature Page     I have received my discharge instructions, and my questions have been answered. I have discussed any challenges I see with this plan with the nurse or doctor.    ..........................................................................................................................................  Patient/Patient Representative Signature      ..........................................................................................................................................  Patient Representative Print Name and Relationship to Patient    ..................................................               ................................................  Date                                            Time    ..........................................................................................................................................  Reviewed by Signature/Title    ...................................................              ..............................................  Date                                                            Time           No

## (undated) DEVICE — SOL NACL 0.9% IRRIG 3000ML BAG 2B7477

## (undated) DEVICE — GLOVE PROTEXIS BLUE W/NEU-THERA 7.0  2D73EB70

## (undated) DEVICE — BAG CLEAR TRASH 1.3M 39X33" P4040C

## (undated) DEVICE — SUCTION IRR STRYKERFLOW II W/TIP 250-070-520

## (undated) DEVICE — SU MONOCRYL 4-0 PS-2 18" UND Y496G

## (undated) DEVICE — ESU GROUND PAD ADULT W/CORD E7507

## (undated) DEVICE — ESU PENCIL W/HOLSTER E2350H

## (undated) DEVICE — SYR 10ML SLIP TIP W/O NDL

## (undated) DEVICE — LINEN POUCH DBL 5427

## (undated) DEVICE — PREP CHLORAPREP 26ML TINTED ORANGE  260815

## (undated) DEVICE — SUCTION CANISTER MEDIVAC LINER 3000ML W/LID 65651-530

## (undated) DEVICE — LINEN FULL SHEET 5511

## (undated) DEVICE — ESU HOLDER LAP INST DISP PURPLE LONG 330MM H-PRO-330

## (undated) DEVICE — GLOVE PROTEXIS W/NEU-THERA 7.0  2D73TE70

## (undated) DEVICE — TROCAR TRIPORT 15 OLYMPUS SINGLE ACCESS WA58015T

## (undated) DEVICE — ESU CORD MONOPOLAR 10'  E0510

## (undated) DEVICE — PACK GYN LAPAROSCOPY RIDGES

## (undated) DEVICE — SOL WATER IRRIG 1000ML BOTTLE 2F7114

## (undated) DEVICE — DRAPE MAYO STAND 23X54 8337

## (undated) DEVICE — LINEN HALF SHEET 5512

## (undated) DEVICE — PAD CHUX UNDERPAD 30X36" P3036C

## (undated) DEVICE — PAD PERI INDIV WRAP 11" 2022A

## (undated) DEVICE — ENDO POUCH GOLD 10MM ECATCH 173050G

## (undated) DEVICE — SUCTION CANISTER STRAW 65652-008

## (undated) DEVICE — CATH TRAY FOLEY 16FR DRAINAGE BAG STATLOCK 899916

## (undated) RX ORDER — MINERAL OIL
OIL (ML) MISCELLANEOUS
Status: DISPENSED
Start: 2017-02-11

## (undated) RX ORDER — KETOROLAC TROMETHAMINE 30 MG/ML
INJECTION, SOLUTION INTRAMUSCULAR; INTRAVENOUS
Status: DISPENSED
Start: 2017-02-11

## (undated) RX ORDER — LIDOCAINE HYDROCHLORIDE 10 MG/ML
INJECTION, SOLUTION EPIDURAL; INFILTRATION; INTRACAUDAL; PERINEURAL
Status: DISPENSED
Start: 2017-02-11

## (undated) RX ORDER — PROPOFOL 10 MG/ML
INJECTION, EMULSION INTRAVENOUS
Status: DISPENSED
Start: 2017-02-11

## (undated) RX ORDER — NEOSTIGMINE METHYLSULFATE 5 MG/5 ML
SYRINGE (ML) INTRAVENOUS
Status: DISPENSED
Start: 2017-02-11

## (undated) RX ORDER — CEFAZOLIN SODIUM 2 G/100ML
INJECTION, SOLUTION INTRAVENOUS
Status: DISPENSED
Start: 2017-02-11

## (undated) RX ORDER — GLYCOPYRROLATE 0.2 MG/ML
INJECTION INTRAMUSCULAR; INTRAVENOUS
Status: DISPENSED
Start: 2017-02-11

## (undated) RX ORDER — ONDANSETRON 2 MG/ML
INJECTION INTRAMUSCULAR; INTRAVENOUS
Status: DISPENSED
Start: 2017-02-11

## (undated) RX ORDER — ACETAMINOPHEN 325 MG/1
TABLET ORAL
Status: DISPENSED
Start: 2017-02-11

## (undated) RX ORDER — DEXAMETHASONE SODIUM PHOSPHATE 4 MG/ML
INJECTION, SOLUTION INTRA-ARTICULAR; INTRALESIONAL; INTRAMUSCULAR; INTRAVENOUS; SOFT TISSUE
Status: DISPENSED
Start: 2017-02-11

## (undated) RX ORDER — BUPIVACAINE HYDROCHLORIDE AND EPINEPHRINE 2.5; 5 MG/ML; UG/ML
INJECTION, SOLUTION EPIDURAL; INFILTRATION; INTRACAUDAL; PERINEURAL
Status: DISPENSED
Start: 2017-02-11